# Patient Record
Sex: MALE | Race: WHITE | Employment: FULL TIME | URBAN - METROPOLITAN AREA
[De-identification: names, ages, dates, MRNs, and addresses within clinical notes are randomized per-mention and may not be internally consistent; named-entity substitution may affect disease eponyms.]

---

## 2019-02-11 ENCOUNTER — HOSPITAL ENCOUNTER (EMERGENCY)
Age: 24
Discharge: HOME | End: 2019-02-11
Payer: MEDICAID

## 2019-02-11 VITALS
HEART RATE: 85 BPM | WEIGHT: 190 LBS | HEIGHT: 72 IN | OXYGEN SATURATION: 99 % | BODY MASS INDEX: 25.73 KG/M2 | RESPIRATION RATE: 18 BRPM | SYSTOLIC BLOOD PRESSURE: 132 MMHG | DIASTOLIC BLOOD PRESSURE: 83 MMHG | TEMPERATURE: 97.5 F

## 2019-02-11 DIAGNOSIS — F41.0 ANXIETY ATTACK: ICD-10-CM

## 2019-02-11 DIAGNOSIS — J01.00 ACUTE NON-RECURRENT MAXILLARY SINUSITIS: Primary | ICD-10-CM

## 2019-02-11 PROCEDURE — 99283 EMERGENCY DEPT VISIT LOW MDM: CPT

## 2019-02-11 PROCEDURE — 6370000000 HC RX 637 (ALT 250 FOR IP): Performed by: PHYSICIAN ASSISTANT

## 2019-02-11 RX ORDER — GUAIFENESIN, PSEUDOEPHEDRINE HYDROCHLORIDE 600; 60 MG/1; MG/1
1 TABLET, EXTENDED RELEASE ORAL EVERY 12 HOURS
Qty: 14 TABLET | Refills: 0 | Status: SHIPPED | OUTPATIENT
Start: 2019-02-11 | End: 2019-02-18

## 2019-02-11 RX ORDER — FLUTICASONE PROPIONATE 50 MCG
1 SPRAY, SUSPENSION (ML) NASAL DAILY
Qty: 1 BOTTLE | Refills: 0 | Status: SHIPPED | OUTPATIENT
Start: 2019-02-11

## 2019-02-11 RX ORDER — AZITHROMYCIN 500 MG/1
500 TABLET, FILM COATED ORAL ONCE
Status: COMPLETED | OUTPATIENT
Start: 2019-02-11 | End: 2019-02-11

## 2019-02-11 RX ORDER — LORAZEPAM 1 MG/1
1 TABLET ORAL ONCE
Status: COMPLETED | OUTPATIENT
Start: 2019-02-11 | End: 2019-02-11

## 2019-02-11 RX ORDER — AZITHROMYCIN 250 MG/1
TABLET, FILM COATED ORAL
Qty: 1 PACKET | Refills: 0 | Status: SHIPPED | OUTPATIENT
Start: 2019-02-11 | End: 2019-02-21

## 2019-02-11 RX ADMIN — AZITHROMYCIN MONOHYDRATE 500 MG: 500 TABLET ORAL at 00:30

## 2019-02-11 RX ADMIN — LORAZEPAM 1 MG: 1 TABLET ORAL at 00:30

## 2019-02-11 ASSESSMENT — ENCOUNTER SYMPTOMS
FACIAL SWELLING: 1
APNEA: 0
ABDOMINAL PAIN: 0
STRIDOR: 0
SINUS PAIN: 1
DIARRHEA: 0
COUGH: 0
VOMITING: 0
TROUBLE SWALLOWING: 0
COLOR CHANGE: 0
SHORTNESS OF BREATH: 0
EYE PAIN: 0
WHEEZING: 0
SORE THROAT: 1
ALLERGIC/IMMUNOLOGIC NEGATIVE: 1

## 2019-12-20 ENCOUNTER — HOSPITAL ENCOUNTER (EMERGENCY)
Age: 24
Discharge: HOME | End: 2019-12-20
Payer: MEDICAID

## 2019-12-20 VITALS
RESPIRATION RATE: 18 BRPM | WEIGHT: 215 LBS | HEART RATE: 78 BPM | SYSTOLIC BLOOD PRESSURE: 146 MMHG | TEMPERATURE: 98.2 F | HEIGHT: 72 IN | BODY MASS INDEX: 29.12 KG/M2 | DIASTOLIC BLOOD PRESSURE: 93 MMHG

## 2019-12-20 DIAGNOSIS — S91.331A PUNCTURE WOUND OF PLANTAR ASPECT OF RIGHT FOOT, INITIAL ENCOUNTER: Primary | ICD-10-CM

## 2019-12-20 PROCEDURE — 99282 EMERGENCY DEPT VISIT SF MDM: CPT

## 2019-12-20 PROCEDURE — 90715 TDAP VACCINE 7 YRS/> IM: CPT | Performed by: PHYSICIAN ASSISTANT

## 2019-12-20 PROCEDURE — 6370000000 HC RX 637 (ALT 250 FOR IP): Performed by: PHYSICIAN ASSISTANT

## 2019-12-20 PROCEDURE — 90471 IMMUNIZATION ADMIN: CPT | Performed by: PHYSICIAN ASSISTANT

## 2019-12-20 PROCEDURE — 6360000002 HC RX W HCPCS: Performed by: PHYSICIAN ASSISTANT

## 2019-12-20 RX ORDER — CIPROFLOXACIN 500 MG/1
500 TABLET, FILM COATED ORAL ONCE
Status: COMPLETED | OUTPATIENT
Start: 2019-12-20 | End: 2019-12-20

## 2019-12-20 RX ORDER — CIPROFLOXACIN 500 MG/1
500 TABLET, FILM COATED ORAL 2 TIMES DAILY
Qty: 20 TABLET | Refills: 0 | Status: SHIPPED | OUTPATIENT
Start: 2019-12-20 | End: 2019-12-30

## 2019-12-20 RX ADMIN — TETANUS TOXOID, REDUCED DIPHTHERIA TOXOID AND ACELLULAR PERTUSSIS VACCINE, ADSORBED 0.5 ML: 5; 2.5; 8; 8; 2.5 SUSPENSION INTRAMUSCULAR at 22:01

## 2019-12-20 RX ADMIN — CIPROFLOXACIN HYDROCHLORIDE 500 MG: 500 TABLET, FILM COATED ORAL at 22:01

## 2019-12-20 ASSESSMENT — ENCOUNTER SYMPTOMS
ALLERGIC/IMMUNOLOGIC NEGATIVE: 1
TROUBLE SWALLOWING: 0
EYE PAIN: 0
COLOR CHANGE: 0
ABDOMINAL PAIN: 0
APNEA: 0
SHORTNESS OF BREATH: 0

## 2021-01-18 ENCOUNTER — HOSPITAL ENCOUNTER (EMERGENCY)
Age: 26
Discharge: HOME | End: 2021-01-18
Payer: MEDICAID

## 2021-01-18 VITALS
BODY MASS INDEX: 24.38 KG/M2 | WEIGHT: 180 LBS | TEMPERATURE: 98 F | DIASTOLIC BLOOD PRESSURE: 98 MMHG | HEIGHT: 72 IN | HEART RATE: 79 BPM | RESPIRATION RATE: 20 BRPM | SYSTOLIC BLOOD PRESSURE: 145 MMHG | OXYGEN SATURATION: 99 %

## 2021-01-18 DIAGNOSIS — Z20.822 COVID-19 RULED OUT: Primary | ICD-10-CM

## 2021-01-18 PROCEDURE — 99284 EMERGENCY DEPT VISIT MOD MDM: CPT

## 2021-01-18 PROCEDURE — U0002 COVID-19 LAB TEST NON-CDC: HCPCS

## 2021-01-18 ASSESSMENT — ENCOUNTER SYMPTOMS
NAUSEA: 0
EYE PAIN: 0
SHORTNESS OF BREATH: 0
COUGH: 0
EYE DISCHARGE: 0
TROUBLE SWALLOWING: 0
SORE THROAT: 0
VOMITING: 0
CONSTIPATION: 0
COLOR CHANGE: 0
BACK PAIN: 0
BLOOD IN STOOL: 0
RHINORRHEA: 1
DIARRHEA: 0
EYE REDNESS: 0
SINUS PRESSURE: 1
ABDOMINAL PAIN: 0
WHEEZING: 0

## 2021-01-18 ASSESSMENT — PAIN DESCRIPTION - PAIN TYPE: TYPE: ACUTE PAIN

## 2021-01-18 ASSESSMENT — PAIN DESCRIPTION - LOCATION: LOCATION: HEAD;GENERALIZED

## 2021-01-18 ASSESSMENT — PAIN DESCRIPTION - DESCRIPTORS: DESCRIPTORS: ACHING

## 2021-01-18 ASSESSMENT — PAIN DESCRIPTION - ORIENTATION: ORIENTATION: RIGHT;LEFT

## 2021-01-18 NOTE — ED PROVIDER NOTES
[address] [facility] ED  EMERGENCY DEPARTMENT ENCOUNTER      Pt Name: Jaja Bridges  MRN: [id number]  [place] 1995  Date of evaluation: 1/18/2021  Provider: TRISTEN Busch CNP    CHIEF COMPLAINT       Chief Complaint   Patient presents with    Concern For COVID-19     was around someone that had it. cough, headaches. HISTORY OF PRESENT ILLNESS   (Location/Symptom, Timing/Onset,Context/Setting, Quality, Duration, Modifying Factors, Severity)  Note limiting factors. Jaja Bridges is a 31 y.o. male who presents to the emergency department with a chart reviewed past medical history of asthma for chief complaint of Covid-like symptoms. Patient reports that he has been coughing and having severe headaches and loss of smell for the past 2 days. Patient denies any shortness of breath or chest pain. Patient has no nausea vomiting or diarrhea. Patient has no alleviating or modifying factors. Requesting Covid test.    Nursing Notes were reviewed. REVIEW OF SYSTEMS    (2-9 systems for level 4, 10 or more for level 5)     Review of Systems   Constitutional: Negative for activity change, appetite change, fatigue and fever. HENT: Positive for congestion, rhinorrhea and sinus pressure. Negative for ear pain, sore throat and trouble swallowing. Eyes: Negative for pain, discharge and redness. Respiratory: Negative for cough, shortness of breath and wheezing. Cardiovascular: Negative for chest pain and palpitations. Gastrointestinal: Negative for abdominal pain, blood in stool, constipation, diarrhea, nausea and vomiting. Endocrine: Negative for polydipsia and polyuria. Genitourinary: Negative for decreased urine volume, dysuria, flank pain and hematuria. Musculoskeletal: Positive for myalgias. Negative for arthralgias and back pain. Skin: Negative for color change, rash and wound. Neurological: Negative for dizziness, syncope, weakness, light-headedness and headaches. Psychiatric/Behavioral: Negative for behavioral problems. All other systems reviewed and are negative. Except as noted above the remainder of the review of systems was reviewed and negative. PAST MEDICAL HISTORY     Past Medical History:   Diagnosis Date    Asthma      History reviewed. No pertinent surgical history.   Social History     Socioeconomic History    Marital status: Single     Spouse name: None    Number of children: None    Years of education: None    Highest education level: None   Occupational History    None   Social Needs    Financial resource strain: None    Food insecurity     Worry: None     Inability: None    Transportation needs     Medical: None     Non-medical: None   Tobacco Use    Smoking status: Current Every Day Smoker     Packs/day: 0.50     Types: Cigarettes    Smokeless tobacco: Never Used   Substance and Sexual Activity    Alcohol use: No    Drug use: Yes     Types: Marijuana    Sexual activity: None   Lifestyle    Physical activity     Days per week: None     Minutes per session: None    Stress: None   Relationships    Social connections     Talks on phone: None     Gets together: None     Attends [employer] service: None     Active member of club or organization: None     Attends meetings of clubs or organizations: None     Relationship status: None    Intimate partner violence     Fear of current or ex partner: None     Emotionally abused: None     Physically abused: None     Forced sexual activity: None   Other Topics Concern    None   Social History Narrative    None       SCREENINGS    [place] Coma Scale  Eye Opening: Spontaneous  Best Verbal Response: Oriented  Best Motor Response: Obeys commands  [place] Coma Scale Score: 15        PHYSICAL EXAM    (up to 7 for level 4, 8 or more for level 5)     ED Triage Vitals [01/18/21 1328]   BP Temp Temp Source Pulse Resp SpO2 Height Weight   (!) 145/98 98 °F (36.7 °C) Oral 79 20 99 % 6' (1.829 m) 180 lb (81.6 kg)       Physical Exam  Vitals signs and nursing note reviewed. Constitutional:       General: He is not in acute distress. Appearance: He is well-developed. He is not diaphoretic. HENT:      Head: Normocephalic and atraumatic. Nose: Nose normal.   Eyes:      Conjunctiva/sclera: Conjunctivae normal.      Pupils: Pupils are equal, round, and reactive to light. Neck:      Musculoskeletal: Normal range of motion and neck supple. Cardiovascular:      Rate and Rhythm: Normal rate and regular rhythm. Heart sounds: Normal heart sounds. Pulmonary:      Effort: Pulmonary effort is normal. No respiratory distress. Breath sounds: Normal breath sounds. No wheezing. Abdominal:      General: Bowel sounds are normal.      Palpations: Abdomen is soft. Tenderness: There is no abdominal tenderness. Skin:     General: Skin is warm and dry. Capillary Refill: Capillary refill takes less than 2 seconds. Findings: No rash. Neurological:      Mental Status: He is alert and oriented to person, place, and time. Cranial Nerves: No cranial nerve deficit. Psychiatric:         Behavior: Behavior normal.         RESULTS     EKG: All EKG's are interpreted by the Emergency Department Physician who either signs or Co-signsthis chart in the absence of a cardiologist.        RADIOLOGY:   Non-plain filmimages such as CT, Ultrasound and MRI are read by the radiologist. Plain radiographic images are visualized and preliminarily interpreted by the emergency physician with the below findings:        Interpretation per the Radiologist below, if available at the time ofthis note:    No orders to display         ED BEDSIDE ULTRASOUND:   Performed by ED Physician - none    LABS:  Labs Reviewed   COVID-19       All other labs were within normal range or not returned as of this dictation.     EMERGENCY DEPARTMENT COURSE and DIFFERENTIAL DIAGNOSIS/MDM:   Vitals:    Vitals:    01/18/21 1328 01/18/21 1342   BP: (!) 145/98    Pulse: 79 79   Resp: 20    Temp: 98 °F (36.7 °C)    TempSrc: Oral    SpO2: 99%    Weight: 180 lb (81.6 kg)    Height: 6' (1.829 m)             MDM   Patient is a 58-year-old male presenting to the ER with Covid-like symptoms. Patient is hemodynamically stable nontoxic-appearing. Patient is 99% on room air. Patient given work note requested and swabbed for Covid. Instructed to quarantine until results are in. Instructed to come back to the ER if worse in any way. Discharged in stable condition with stable vital signs.     CRITICAL CARE TIME       CONSULTS:  None    PROCEDURES:  Unless otherwise noted below, none     Procedures    FINAL IMPRESSION      1. COVID-19 ruled out          DISPOSITION/PLAN   DISPOSITION Decision To Discharge 01/18/2021 02:04:35 PM      PATIENT REFERRED TO:  Emiliana Mac MD  [address]  [phone]    Schedule an appointment as soon as possible for a visit in 1 day        DISCHARGE MEDICATIONS:  New Prescriptions    No medications on file          (Please notethat portions of this note were completed with a voice recognition program.  Efforts were made to edit the dictations but occasionally words are mis-transcribed.)    TRISTEN Khanna CNP (electronically signed)  Attending Emergency Physician          TRISTEN Ren CNP  01/18/21 0026

## 2021-01-18 NOTE — ED NOTES
Discharge instructions given to patient. Patient is instructed on how [facility] lab results are obtained. Patient is encouraged to self quarantine for 10 days. Work note is provided. Patient ambulates out of ER with steady gait.       Danielle Bloom RN  01/18/21 6824

## 2021-01-19 ENCOUNTER — CARE COORDINATION (OUTPATIENT)
Dept: CARE COORDINATION | Age: 26
End: 2021-01-19

## 2021-01-19 LAB — SARS-COV-2, PCR: NOT DETECTED

## 2021-01-20 ENCOUNTER — CARE COORDINATION (OUTPATIENT)
Dept: CARE COORDINATION | Age: 26
End: 2021-01-20

## 2023-06-05 NOTE — ED NOTES
Yessica Galaviz NP at bedside.       Joanna Gusman RN  01/18/21 2737 [Patient] : patient [Mother] : mother Detail Level: Detailed